# Patient Record
Sex: FEMALE | Race: BLACK OR AFRICAN AMERICAN | NOT HISPANIC OR LATINO | Employment: OTHER | URBAN - METROPOLITAN AREA
[De-identification: names, ages, dates, MRNs, and addresses within clinical notes are randomized per-mention and may not be internally consistent; named-entity substitution may affect disease eponyms.]

---

## 2024-08-20 ENCOUNTER — TELEPHONE (OUTPATIENT)
Dept: CARDIOTHORACIC SURGERY | Facility: CLINIC | Age: 74
End: 2024-08-20
Payer: MEDICARE

## 2024-08-20 NOTE — TELEPHONE ENCOUNTER
Images received from Dr Kaur's office and [facility]. Pt scheduled tomorrow at 12. Called patient and spoke with her and her daughter.    Notified Nurse Hodan with Dr Kaur's office that we would see her tomorrow.    Julie Haase RN  [facility] Nurse Navigator [phone]

## 2024-08-20 NOTE — TELEPHONE ENCOUNTER
"Returned call to [facility]/ [facility] and informed her that our CTS clinic has not yet received pt's records.  Hodan confirmed our fax number and stated that she will refax them.  Inquired if pt's referral is to a particular provider, which Hodan mentioned that it is not.  Informed Hodan that once pt's records are received, pt will be contacted for a consultation, which she verbalized understanding to.    ----- Message from Stefania Knight sent at 8/20/2024 10:52 AM CDT -----  Regarding: Referral Questions          Name Of Caller:      [facility]       Contact Preference?:    [phone]       What is the nature of the call?:  Calling to verify whether the office received the pt's referral. It was sent today from Dr. Juan David Kaur.        Additional Notes:  "Thank you for all that you do for our patients  "

## 2024-08-21 ENCOUNTER — TELEPHONE (OUTPATIENT)
Dept: CARDIOTHORACIC SURGERY | Facility: CLINIC | Age: 74
End: 2024-08-21
Payer: MEDICARE

## 2024-08-21 ENCOUNTER — OFFICE VISIT (OUTPATIENT)
Dept: CARDIOTHORACIC SURGERY | Facility: CLINIC | Age: 74
End: 2024-08-21
Payer: MEDICARE

## 2024-08-21 VITALS
DIASTOLIC BLOOD PRESSURE: 84 MMHG | SYSTOLIC BLOOD PRESSURE: 146 MMHG | BODY MASS INDEX: 36.1 KG/M2 | OXYGEN SATURATION: 99 % | WEIGHT: 183.88 LBS | HEART RATE: 70 BPM | HEIGHT: 60 IN

## 2024-08-21 DIAGNOSIS — I71.9 PSEUDOANEURYSM OF AORTA: Primary | ICD-10-CM

## 2024-08-21 DIAGNOSIS — I71.9 AORTIC ANEURYSM, UNSPECIFIED PORTION OF AORTA, UNSPECIFIED WHETHER RUPTURED: ICD-10-CM

## 2024-08-21 PROCEDURE — 1125F AMNT PAIN NOTED PAIN PRSNT: CPT | Mod: CPTII,S$GLB,, | Performed by: STUDENT IN AN ORGANIZED HEALTH CARE EDUCATION/TRAINING PROGRAM

## 2024-08-21 PROCEDURE — 99999 PR PBB SHADOW E&M-EST. PATIENT-LVL III: CPT | Mod: PBBFAC,,, | Performed by: STUDENT IN AN ORGANIZED HEALTH CARE EDUCATION/TRAINING PROGRAM

## 2024-08-21 PROCEDURE — 3079F DIAST BP 80-89 MM HG: CPT | Mod: CPTII,S$GLB,, | Performed by: STUDENT IN AN ORGANIZED HEALTH CARE EDUCATION/TRAINING PROGRAM

## 2024-08-21 PROCEDURE — 99204 OFFICE O/P NEW MOD 45 MIN: CPT | Mod: S$GLB,,, | Performed by: STUDENT IN AN ORGANIZED HEALTH CARE EDUCATION/TRAINING PROGRAM

## 2024-08-21 PROCEDURE — 1159F MED LIST DOCD IN RCRD: CPT | Mod: CPTII,S$GLB,, | Performed by: STUDENT IN AN ORGANIZED HEALTH CARE EDUCATION/TRAINING PROGRAM

## 2024-08-21 PROCEDURE — 3077F SYST BP >= 140 MM HG: CPT | Mod: CPTII,S$GLB,, | Performed by: STUDENT IN AN ORGANIZED HEALTH CARE EDUCATION/TRAINING PROGRAM

## 2024-08-21 RX ORDER — SERTRALINE HYDROCHLORIDE 50 MG/1
50 TABLET, FILM COATED ORAL DAILY
COMMUNITY
Start: 2024-06-09

## 2024-08-21 RX ORDER — AMIODARONE HYDROCHLORIDE 200 MG/1
200 TABLET ORAL 2 TIMES DAILY
COMMUNITY
Start: 2024-03-11

## 2024-08-21 RX ORDER — OLMESARTAN MEDOXOMIL 40 MG/1
40 TABLET ORAL DAILY
COMMUNITY
Start: 2024-07-08

## 2024-08-21 RX ORDER — PANTOPRAZOLE SODIUM 40 MG/1
40 TABLET, DELAYED RELEASE ORAL DAILY
COMMUNITY
Start: 2024-03-08 | End: 2024-08-21

## 2024-08-21 RX ORDER — METFORMIN HYDROCHLORIDE 500 MG/1
500 TABLET ORAL 2 TIMES DAILY
COMMUNITY
Start: 2024-06-23

## 2024-08-21 RX ORDER — ALLOPURINOL 300 MG/1
300 TABLET ORAL DAILY
COMMUNITY
Start: 2024-07-08

## 2024-08-21 RX ORDER — FLUTICASONE PROPIONATE 50 MCG
2 SPRAY, SUSPENSION (ML) NASAL DAILY PRN
COMMUNITY
Start: 2024-04-12

## 2024-08-21 RX ORDER — LORATADINE 10 MG/1
10 TABLET ORAL DAILY PRN
COMMUNITY
Start: 2024-06-24

## 2024-08-21 RX ORDER — AMLODIPINE BESYLATE 5 MG/1
5 TABLET ORAL DAILY
COMMUNITY
Start: 2024-04-08

## 2024-08-21 RX ORDER — TAMSULOSIN HYDROCHLORIDE 0.4 MG/1
CAPSULE ORAL DAILY
COMMUNITY
Start: 2024-08-07

## 2024-08-21 RX ORDER — PREGABALIN 50 MG/1
50 CAPSULE ORAL NIGHTLY
COMMUNITY
Start: 2024-06-23

## 2024-08-21 RX ORDER — GABAPENTIN 100 MG/1
100 CAPSULE ORAL 3 TIMES DAILY
COMMUNITY
Start: 2024-06-24

## 2024-08-21 RX ORDER — CARVEDILOL 12.5 MG/1
12.5 TABLET ORAL 2 TIMES DAILY
COMMUNITY
Start: 2024-06-23

## 2024-08-21 RX ORDER — ROSUVASTATIN CALCIUM 10 MG/1
10 TABLET, COATED ORAL NIGHTLY
COMMUNITY
Start: 2024-08-08

## 2024-08-21 NOTE — PROGRESS NOTES
Subjective:      Patient ID: Celeste Solis is a 74 y.o. female.    Chief Complaint: new patient      HPI:  Celeste Solis is a 74 y.o. female who presents to an initial surgical evaluation for chronic pseudoaneurysm. Medical conditions include severe AR s/p AVR by Dr. Kaur on 2/2018  c/b complete heart block s/p permanent pacemaker placement, HFpEF, obesity, SAMEER, GERD, gout. Patient had  preop work up for hip replacement surgery, CXR showed widening mediastinum. Subsequent outpatient CTA chest 8/16/2024 showed possible subacute to chronic type A dissection. Was admitted to OSH, BP controlled, and evaluated by CTS for which referred here for higher level of care.     Present to clinic in a rolling walker. Uses walker due to left hip pain.  Reports some chest discomfort that occurred in march but has no chest pain at present. Also has chronic sob that is stable.  Denies SOB, chest pain, orthopnea, PND. She notes that prior to OSH hospitalization, BP was elevated. Does not monitor BP daily. In clinic, /84.     No smoking or illicit drug use. Seldom ETOH    Family and social history reviewed    Review of patient's allergies indicates:  No Known Allergies  No past medical history on file.  No past surgical history on file.  Family History    None       Social History     Socioeconomic History    Marital status:    Tobacco Use    Smoking status: Never    Smokeless tobacco: Never       Current medications Reviewed  No current outpatient medications on file prior to visit.     No current facility-administered medications on file prior to visit.       Review of Systems   Constitutional:  Negative for activity change, appetite change, fatigue and fever.   HENT:  Negative for nosebleeds.    Respiratory:  Positive for shortness of breath (stable). Negative for cough.    Cardiovascular:  Negative for chest pain, palpitations and leg swelling.   Gastrointestinal:  Negative for abdominal distention, abdominal pain  and nausea.   Genitourinary:  Negative for frequency.   Musculoskeletal:  Negative for arthralgias and myalgias.   Skin:  Negative for rash.   Neurological:  Negative for dizziness and numbness.   Hematological:  Does not bruise/bleed easily.     Objective:   Physical Exam  Constitutional:       Appearance: Normal appearance. She is obese.   HENT:      Head: Normocephalic and atraumatic.   Eyes:      Extraocular Movements: Extraocular movements intact.   Cardiovascular:      Rate and Rhythm: Normal rate.   Pulmonary:      Effort: Pulmonary effort is normal.   Abdominal:      General: Abdomen is flat.      Palpations: Abdomen is soft.   Musculoskeletal:         General: Normal range of motion.      Cervical back: Normal range of motion.   Skin:     General: Skin is warm and dry.      Capillary Refill: Capillary refill takes less than 2 seconds.      Coloration: Skin is not pale.   Neurological:      General: No focal deficit present.      Mental Status: She is alert.     Diagnostic Results: reviewed   OSH CTA reviewed     Assessment:   Pseudoaneurysm aortic root.  Plan:     CTS Attending Note:    I have seen the patient and reviewed the Physician Assistant's note above. I have personally interviewed and examined the patient at bedside and agree with the findings.     Ms. Solis is a pleasant 74 y.o. 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  Her comorbidities are obesity, SAMEER, CHB s/p PPM, hip osteoarthritis currently wheel-chair bound.    CT Chest CAP: large pasudoaneurysm from the aortic root with previous SAVR.         Impression:  This is a large pseudoaneurysm and the sac extends distally all the way to the origin of the innominate artery. I suspect that she has developed this after her SAVR and is contained by the adhesions post-cardiotomy.   Given that the pseudoaneurysm abuts the sternum and extends all the way to  the arch post SAVR, an attempt to repair this surgically would be extremely high-risk. This risk is also compounded by the weak aortic tissue quality, which didn't hold sutures after the first surgery and her comorbidities.  Given that her aortic pathology had been contained for some time and required intervention is extremely high risk it would not be unreasonable to continue with close surveillance DrAriella Did include tight control of blood pressure and interval CT scans. We had a detailed discussion about the nature of disease, its implications, & pros and cons of various treatment options.   We are planning to get another CT scan in 6 months.  I also encouraged her to get another opinion at a different cardiac surgery Center.  I suggested [facility].  Ms. Solis is in agreement. All the questions were answered. She expressed understanding. She expressed understanding of the implications of the given scenario and had no further questions at this stage.    Thank you very much for involving me in Ms. Solis's care.  I have also discussed her case, which included a history and imaging, but my  Dr. Adair, who is in agreement with the current plan.      Simeon Shaw MD  Cardiac Surgery

## 2024-08-21 NOTE — TELEPHONE ENCOUNTER
Called pt and let her know I received her message. Left message asking how late she thought she would be and asked her to please call back.    Julie Haase RN  CTS Nurse Navigator     ----- Message from Fabián Mathis sent at 8/21/2024  9:27 AM CDT -----  Regarding: Possibly late  Contact: [phone]  Hi, pt called to say she possibly be a few minutes late for the appt today.[phone]    Thank you.

## 2024-08-23 PROBLEM — I71.9: Status: ACTIVE | Noted: 2024-08-23
